# Patient Record
Sex: MALE | Race: WHITE | Employment: OTHER
[De-identification: names, ages, dates, MRNs, and addresses within clinical notes are randomized per-mention and may not be internally consistent; named-entity substitution may affect disease eponyms.]

---

## 2021-04-23 ENCOUNTER — TRANSCRIBE ORDERS (OUTPATIENT)
Dept: SCHEDULING | Age: 66
End: 2021-04-23

## 2021-04-23 DIAGNOSIS — Z11.59 ENCOUNTER FOR SCREENING FOR OTHER VIRAL DISEASES: Primary | ICD-10-CM

## 2021-05-11 ENCOUNTER — APPOINTMENT (OUTPATIENT)
Dept: LAB | Facility: HOSPITAL | Age: 66
End: 2021-05-11
Attending: PODIATRIST
Payer: MEDICARE

## 2021-05-11 DIAGNOSIS — Z11.59 ENCOUNTER FOR SCREENING FOR OTHER VIRAL DISEASES: ICD-10-CM

## 2021-05-11 PROCEDURE — U0002 COVID-19 LAB TEST NON-CDC: HCPCS

## 2021-05-12 LAB — SARS-COV-2 RNA RESP QL NAA+PROBE: NEGATIVE

## 2021-05-13 ENCOUNTER — ANESTHESIA EVENT (OUTPATIENT)
Dept: OPERATING ROOM | Facility: HOSPITAL | Age: 66
Setting detail: HOSPITAL OUTPATIENT SURGERY
End: 2021-05-13
Payer: MEDICARE

## 2021-05-14 ENCOUNTER — ANESTHESIA (OUTPATIENT)
Dept: OPERATING ROOM | Facility: HOSPITAL | Age: 66
Setting detail: HOSPITAL OUTPATIENT SURGERY
End: 2021-05-14
Payer: MEDICARE

## 2021-05-14 ENCOUNTER — HOSPITAL ENCOUNTER (OUTPATIENT)
Facility: HOSPITAL | Age: 66
Setting detail: HOSPITAL OUTPATIENT SURGERY
Discharge: HOME | End: 2021-05-14
Attending: PODIATRIST | Admitting: PODIATRIST
Payer: MEDICARE

## 2021-05-14 VITALS
WEIGHT: 205 LBS | RESPIRATION RATE: 16 BRPM | DIASTOLIC BLOOD PRESSURE: 95 MMHG | TEMPERATURE: 97.8 F | HEART RATE: 69 BPM | HEIGHT: 70 IN | BODY MASS INDEX: 29.35 KG/M2 | SYSTOLIC BLOOD PRESSURE: 148 MMHG | OXYGEN SATURATION: 98 %

## 2021-05-14 LAB
GLUCOSE BLD-MCNC: 98 MG/DL (ref 70–99)
POCT TEST: NORMAL

## 2021-05-14 PROCEDURE — 36000003 HC OR LEVEL 3 INITIAL 30MIN: Performed by: PODIATRIST

## 2021-05-14 PROCEDURE — 71000002 HC PACU PHASE 2 INITIAL 30MIN: Performed by: PODIATRIST

## 2021-05-14 PROCEDURE — 71000001 HC PACU PHASE 1 INITIAL 30MIN: Performed by: PODIATRIST

## 2021-05-14 PROCEDURE — 27200000 HC STERILE SUPPLY: Performed by: PODIATRIST

## 2021-05-14 PROCEDURE — 63600000 HC DRUGS/DETAIL CODE: Performed by: NURSE ANESTHETIST, CERTIFIED REGISTERED

## 2021-05-14 PROCEDURE — 37000002 HC ANESTHESIA MAC: Performed by: PODIATRIST

## 2021-05-14 PROCEDURE — 71000011 HC PACU PHASE 1 EA ADDL MIN: Performed by: PODIATRIST

## 2021-05-14 PROCEDURE — 71000012 HC PACU PHASE 2 EA ADDL MIN: Performed by: PODIATRIST

## 2021-05-14 PROCEDURE — 25800000 HC PHARMACY IV SOLUTIONS: Performed by: NURSE ANESTHETIST, CERTIFIED REGISTERED

## 2021-05-14 PROCEDURE — 63600000 HC DRUGS/DETAIL CODE: Performed by: STUDENT IN AN ORGANIZED HEALTH CARE EDUCATION/TRAINING PROGRAM

## 2021-05-14 PROCEDURE — 0LNT0ZZ RELEASE LEFT ANKLE TENDON, OPEN APPROACH: ICD-10-PCS | Performed by: PODIATRIST

## 2021-05-14 DEVICE — ALLOGRAFT 2.0ML ALLOGEN FLUID CONNECTIVE TISSUE MATRIX: Type: IMPLANTABLE DEVICE | Site: FOOT | Status: FUNCTIONAL

## 2021-05-14 RX ORDER — CEFAZOLIN SODIUM 2 G/50ML
2 SOLUTION INTRAVENOUS
Status: COMPLETED | OUTPATIENT
Start: 2021-05-14 | End: 2021-05-14

## 2021-05-14 RX ORDER — CEFAZOLIN SODIUM 2 G/50ML
SOLUTION INTRAVENOUS
Status: COMPLETED
Start: 2021-05-14 | End: 2021-05-14

## 2021-05-14 RX ORDER — FENTANYL CITRATE 50 UG/ML
50 INJECTION, SOLUTION INTRAMUSCULAR; INTRAVENOUS
Status: DISCONTINUED | OUTPATIENT
Start: 2021-05-14 | End: 2021-05-14 | Stop reason: HOSPADM

## 2021-05-14 RX ORDER — HYDROMORPHONE HYDROCHLORIDE 1 MG/ML
0.5 INJECTION, SOLUTION INTRAMUSCULAR; INTRAVENOUS; SUBCUTANEOUS
Status: DISCONTINUED | OUTPATIENT
Start: 2021-05-14 | End: 2021-05-14 | Stop reason: HOSPADM

## 2021-05-14 RX ORDER — SODIUM CHLORIDE 9 MG/ML
INJECTION, SOLUTION INTRAVENOUS CONTINUOUS PRN
Status: DISCONTINUED | OUTPATIENT
Start: 2021-05-14 | End: 2021-05-14 | Stop reason: SURG

## 2021-05-14 RX ORDER — FENTANYL CITRATE 50 UG/ML
INJECTION, SOLUTION INTRAMUSCULAR; INTRAVENOUS AS NEEDED
Status: DISCONTINUED | OUTPATIENT
Start: 2021-05-14 | End: 2021-05-14 | Stop reason: SURG

## 2021-05-14 RX ORDER — PROPOFOL 10 MG/ML
INJECTION, EMULSION INTRAVENOUS CONTINUOUS PRN
Status: DISCONTINUED | OUTPATIENT
Start: 2021-05-14 | End: 2021-05-14 | Stop reason: SURG

## 2021-05-14 RX ADMIN — CEFAZOLIN 2 G: 330 INJECTION, POWDER, FOR SOLUTION INTRAMUSCULAR; INTRAVENOUS at 13:57

## 2021-05-14 RX ADMIN — SODIUM CHLORIDE: 900 INJECTION, SOLUTION INTRAVENOUS at 13:56

## 2021-05-14 RX ADMIN — PROPOFOL 333 MCG/KG/MIN: 10 INJECTION, EMULSION INTRAVENOUS at 13:58

## 2021-05-14 RX ADMIN — FENTANYL CITRATE 25 MCG: 50 INJECTION, SOLUTION INTRAMUSCULAR; INTRAVENOUS at 13:58

## 2021-05-14 ASSESSMENT — PAIN - FUNCTIONAL ASSESSMENT: PAIN_FUNCTIONAL_ASSESSMENT: NO/DENIES PAIN

## 2021-05-14 ASSESSMENT — PAIN SCALES - GENERAL: PAIN_LEVEL: 0

## 2021-05-14 NOTE — ANESTHESIA POSTPROCEDURE EVALUATION
Patient: Scott Canseco    Procedure Summary     Date: 05/14/21 Room / Location: [facility] OR  / [facility] OR [facility]    Anesthesia Start: 1356 Anesthesia Stop: 1425    Procedure: Left Achilles Tenolysis Under Ultrasound Guidance (Left ) Diagnosis:       Tendonitis, Achilles, left      (Tendonitis, Achilles, left [M76.62])    Surgeons: Nahomy Argueta DPM Responsible Provider: Isreal Guzman MD    Anesthesia Type: MAC ASA Status: 2          Anesthesia Type: MAC  PACU Vitals    No data found in the last 10 encounters.           Anesthesia Post Evaluation    Pain score: 0  Pain management: adequate  Patient location during evaluation: PACU  Patient participation: complete - patient participated  Level of consciousness: awake and alert  Cardiovascular status: acceptable  Airway Patency: adequate  Respiratory status: acceptable, nasal cannula and spontaneous ventilation  Hydration status: acceptable  Anesthetic complications: no

## 2021-05-14 NOTE — ANESTHESIOLOGIST PRE-PROCEDURE ATTESTATION
Pre-Procedure Patient Identification:  I am the Primary Anesthesiologist and have identified the patient on 05/14/21 at 1:40 PM.   I have confirmed the following procedure(s) Left Achilles Tenolysis Under Ultrasound Guidance (L) will be performed by the following surgeon/proceduralist Nahomy Argueta DPM.

## 2021-05-14 NOTE — OP NOTE
Operative Note    Hospital: [facility]  Medical Record Number:  [id number]  Patient Name:  Scott Canseco  YOB: 1955   Date of Operation:  5/14/2021  Primary Surgeon: Nahomy Argueta D.P.M.  First Assistant: Boston Downs DPM    Preoperative Diagnosis: Left Achilles tendinitis    Postoperative Diagnosis: Same    Procedure: Left Achilles tenolysis under ultrasound guidance with amniotic graft injection    Anesthesia: Monitor Anesthesia Care  + local (10 cc of 1% lidocaine plain)    Hemostasis: No tourniquet used    Operative Findings: as expected    Estimated Blood Loss: 0 mL    Specimens: None    Implants:   Implant Name Type Inv. Item Serial No.  Lot No. LRB No. Used Action   ALLOGRAFT 2.0ML ALLOGEN FLUID CONNECTIVE TISSUE MATRIX - [id number] - [id number] Implant Tissue ALLOGRAFT 2.0ML ALLOGEN FLUID CONNECTIVE TISSUE [employer]X [id number] Other N/A Left 1 Implanted   ALLOGRAFT 2.0ML ALLOGEN FLUID CONNECTIVE TISSUE [employer]X - [id number] - [id number] Implant Tissue ALLOGRAFT 2.0ML ALLOGEN FLUID CONNECTIVE TISSUE [employer]X [id number] Other N/A Left 1 Implanted       Injectables: 4cc of allogen mixed with 4cc 1% lidocaine plain         Complications:  None; patient tolerated the procedure well.           Disposition: PACU - hemodynamically stable.           Condition: stable    Summary :   Scott Canseco presents to [facility] for surgical intervention of his left ankle, specifically pain and tendonitis of the left Achilles tendon. He has attempted many outpatient conservative therapies without resolution of his symptoms. Outpatient imaging reveals continued thickening and inflammation about the Achilles tendon watershed area with some extension distally towards the insertion. He has opted for surgical intervention at this time for ultrasound guidance injection of amnion with tenolysis. All risks, complications, benefits, and alternative procedures were  discussed in full detail with the patient. All questions were answered. No guarantees were given. In pre-op the patients’ vital signs are stable and neurovascular status is intact.     The patient was transferred to the operating room and placed on the operating room table in the supine position.  The correct surgical site was identified which is the left ankle.  Once anesthesia was achieved, the above mentioned local block was administered. The foot and ankle were prepped and draped in the usual aseptic technique and a timeout was performed before the beginning of the procedure.     Procedure #1: Ultrasound guided debridement and Achilles repair with stem cell injection left achilles tendon     Attention was then directed towards the posterior left Achilles tendon.  There is noted to be a palpable thickening of the Achilles tendon in the mid substance of the tendon about 3-6 cm proximal to its insertion. A steriley prepped ultrasound was utilized for visualization of the Achilles tendon. The appearance of the Achilles tendon under ultrasound revealed thickening and intrasubstance tendon fiber tears consistent with chronic Achilles tendon injury. There were no complete ruptures or tears noted. Next, a 25-gauge needle was utilized for debridement of the tendon. The needle was inserted from lateral to medial across the entire width of the tendon at the level of tendon damage. Special care was taken to not cause excessive damage to the surrounding paratenon. The ultrasound was utilized to guide the needle through the tendon in multiple directions and planes. Five separate sites of debridement were utilized to encompass the entire level of the degenerative changes in the tendon. After debridement a total of 4cc of Allogen mixed with 4cc of 1% Lidocaine plain was injected into the Achilles tendon at each level of degeneration and tearing with a 25 gauge needle under ultrasound visualization.  A dressing consisting of an  Mepliex, cast padding  and an ACE bandage was applied to the left Achilles.      The patient tolerated the procedure well with all vital signs stable and neurovascular status intact. Once aroused from anesthesia the patient was transferred from the operating room to PACU and discharged home per protocol.    Patient is instructed to be weight bearing as tolerated in a cam walker and to follow-up with Dr. Nahomy Argueta in 1 week    Nahomy Argueta DPM  Phone Number: [phone]

## 2021-05-14 NOTE — DISCHARGE INSTRUCTIONS
[facility]  [place], [place]  [phone]  Dr. Nahomy Argueta    Post Operative Instructions    Keep foot elevated as much as possible above the level of the heart.    Keep bandage clean and dry.  Do not remove unless instructed to do so by your Physician.  If bandage becomes soiled or wet you may change it and apply band-aid    Apply ice pack to posterior leg for 20 minutes 3-5 times a day unless otherwise advised.      Bear weight on the area only as advised:  Weight bearing as tolerated in CAM boot    Should you have excessive bleeding (through the bandage) or excessive discomfort, please call the office immediately.    Please call the office number above: to set up your 1st follow up appointment in 1-week

## 2021-05-14 NOTE — OR SURGEON
I am the primary attending surgeon/physician for procedure on Scott Canseco and have identified the patient by name, MR#, and date of birth. Laterality of the procedure was identified and full review of the procedure to be performed was discussed.    Nahomy Argueta DPM   [phone]

## 2021-05-14 NOTE — ANESTHESIA PREPROCEDURE EVALUATION
Relevant Problems   No relevant active problems       Anesthesia ROS/MED HX      Cardiovascular   hypertension   Covid19 Test Reviewed and ECG reviewed  Endo/Other   Diabetes  Body Habitus: Overweight       No past surgical history on file.    Physical Exam    Airway   Mallampati: I   TM distance: >3 FB   Neck ROM: full  Cardiovascular    Rhythm: regular   Rate: normalPulmonary    clear to auscultation        Anesthesia Plan    Plan: MAC    Technique: MAC     Airway: natural airway / supplemental oxygen       patient did not smoke on day of surgery  ASA 2  Blood Products:   Use of Blood Products Discussed: No   Anesthetic plan and risks discussed with: patient  Induction:    intravenous   Postop Plan:   Patient Disposition: phase II then home   Pain Management: IV analgesics

## 2022-03-04 ENCOUNTER — HOSPITAL ENCOUNTER (OUTPATIENT)
Dept: RADIOLOGY | Facility: MEDICAL CENTER | Age: 67
End: 2022-03-04

## 2022-03-04 ENCOUNTER — APPOINTMENT (OUTPATIENT)
Dept: RADIOLOGY | Facility: MEDICAL CENTER | Age: 67
DRG: 089 | End: 2022-03-04
Payer: MEDICARE

## 2022-03-04 ENCOUNTER — HOSPITAL ENCOUNTER (INPATIENT)
Facility: MEDICAL CENTER | Age: 67
LOS: 1 days | DRG: 089 | End: 2022-03-05
Attending: EMERGENCY MEDICINE | Admitting: SURGERY
Payer: MEDICARE

## 2022-03-04 DIAGNOSIS — S02.31XA CLOSED FRACTURE OF RIGHT ORBITAL FLOOR, INITIAL ENCOUNTER (HCC): ICD-10-CM

## 2022-03-04 PROBLEM — E78.5 HYPERLIPIDEMIA: Status: ACTIVE | Noted: 2022-03-04

## 2022-03-04 PROBLEM — T14.90XA TRAUMA: Status: ACTIVE | Noted: 2022-03-04

## 2022-03-04 PROBLEM — M25.511 ACUTE PAIN OF RIGHT SHOULDER: Status: ACTIVE | Noted: 2022-03-04

## 2022-03-04 PROBLEM — G47.33 OBSTRUCTIVE SLEEP APNEA SYNDROME: Status: ACTIVE | Noted: 2022-03-04

## 2022-03-04 PROBLEM — S06.0X0A CONCUSSION WITHOUT LOSS OF CONSCIOUSNESS: Status: ACTIVE | Noted: 2022-03-04

## 2022-03-04 PROBLEM — Z78.9 NO CONTRAINDICATION TO DEEP VEIN THROMBOSIS (DVT) PROPHYLAXIS: Status: ACTIVE | Noted: 2022-03-04

## 2022-03-04 PROBLEM — Z11.52 ENCOUNTER FOR SCREENING FOR COVID-19: Status: ACTIVE | Noted: 2022-03-04

## 2022-03-04 LAB
ABO GROUP BLD: NORMAL
ALBUMIN SERPL BCP-MCNC: 4.5 G/DL (ref 3.2–4.9)
ALBUMIN/GLOB SERPL: 1.7 G/DL
ALP SERPL-CCNC: 47 U/L (ref 30–99)
ALT SERPL-CCNC: 24 U/L (ref 2–50)
ANION GAP SERPL CALC-SCNC: 14 MMOL/L (ref 7–16)
APTT PPP: 26.2 SEC (ref 24.7–36)
AST SERPL-CCNC: 28 U/L (ref 12–45)
BILIRUB SERPL-MCNC: 0.8 MG/DL (ref 0.1–1.5)
BLD GP AB SCN SERPL QL: NORMAL
BUN SERPL-MCNC: 21 MG/DL (ref 8–22)
CALCIUM SERPL-MCNC: 9.7 MG/DL (ref 8.5–10.5)
CHLORIDE SERPL-SCNC: 106 MMOL/L (ref 96–112)
CO2 SERPL-SCNC: 20 MMOL/L (ref 20–33)
CREAT SERPL-MCNC: 0.98 MG/DL (ref 0.5–1.4)
ERYTHROCYTE [DISTWIDTH] IN BLOOD BY AUTOMATED COUNT: 44 FL (ref 35.9–50)
ETHANOL BLD-MCNC: <10.1 MG/DL (ref 0–10)
FLUAV RNA SPEC QL NAA+PROBE: NEGATIVE
FLUBV RNA SPEC QL NAA+PROBE: NEGATIVE
GLOBULIN SER CALC-MCNC: 2.7 G/DL (ref 1.9–3.5)
GLUCOSE SERPL-MCNC: 116 MG/DL (ref 65–99)
HCT VFR BLD AUTO: 41.9 % (ref 42–52)
HGB BLD-MCNC: 14.4 G/DL (ref 14–18)
INR PPP: 1.03 (ref 0.87–1.13)
MCH RBC QN AUTO: 31.2 PG (ref 27–33)
MCHC RBC AUTO-ENTMCNC: 34.4 G/DL (ref 33.7–35.3)
MCV RBC AUTO: 90.7 FL (ref 81.4–97.8)
PLATELET # BLD AUTO: 166 K/UL (ref 164–446)
PMV BLD AUTO: 10.6 FL (ref 9–12.9)
POTASSIUM SERPL-SCNC: 3.9 MMOL/L (ref 3.6–5.5)
PROT SERPL-MCNC: 7.2 G/DL (ref 6–8.2)
PROTHROMBIN TIME: 13.2 SEC (ref 12–14.6)
RBC # BLD AUTO: 4.62 M/UL (ref 4.7–6.1)
RH BLD: NORMAL
RSV RNA SPEC QL NAA+PROBE: NEGATIVE
SARS-COV-2 RNA RESP QL NAA+PROBE: NOTDETECTED
SODIUM SERPL-SCNC: 140 MMOL/L (ref 135–145)
SPECIMEN SOURCE: NORMAL
WBC # BLD AUTO: 12 K/UL (ref 4.8–10.8)

## 2022-03-04 PROCEDURE — 0241U HCHG SARS-COV-2 COVID-19 NFCT DS RESP RNA 4 TRGT MIC: CPT

## 2022-03-04 PROCEDURE — 700102 HCHG RX REV CODE 250 W/ 637 OVERRIDE(OP): Performed by: SPECIALIST

## 2022-03-04 PROCEDURE — 86850 RBC ANTIBODY SCREEN: CPT

## 2022-03-04 PROCEDURE — 99285 EMERGENCY DEPT VISIT HI MDM: CPT

## 2022-03-04 PROCEDURE — 85730 THROMBOPLASTIN TIME PARTIAL: CPT | Mod: 91

## 2022-03-04 PROCEDURE — 36415 COLL VENOUS BLD VENIPUNCTURE: CPT

## 2022-03-04 PROCEDURE — 96375 TX/PRO/DX INJ NEW DRUG ADDON: CPT

## 2022-03-04 PROCEDURE — 80053 COMPREHEN METABOLIC PANEL: CPT | Mod: 91

## 2022-03-04 PROCEDURE — 700105 HCHG RX REV CODE 258: Performed by: EMERGENCY MEDICINE

## 2022-03-04 PROCEDURE — 96374 THER/PROPH/DIAG INJ IV PUSH: CPT

## 2022-03-04 PROCEDURE — 99222 1ST HOSP IP/OBS MODERATE 55: CPT | Performed by: SURGERY

## 2022-03-04 PROCEDURE — 700111 HCHG RX REV CODE 636 W/ 250 OVERRIDE (IP): Performed by: EMERGENCY MEDICINE

## 2022-03-04 PROCEDURE — 3E0234Z INTRODUCTION OF SERUM, TOXOID AND VACCINE INTO MUSCLE, PERCUTANEOUS APPROACH: ICD-10-PCS | Performed by: SURGERY

## 2022-03-04 PROCEDURE — 82077 ASSAY SPEC XCP UR&BREATH IA: CPT

## 2022-03-04 PROCEDURE — 90471 IMMUNIZATION ADMIN: CPT

## 2022-03-04 PROCEDURE — C9803 HOPD COVID-19 SPEC COLLECT: HCPCS | Performed by: SPECIALIST

## 2022-03-04 PROCEDURE — 770001 HCHG ROOM/CARE - MED/SURG/GYN PRIV*

## 2022-03-04 PROCEDURE — 700105 HCHG RX REV CODE 258: Performed by: SPECIALIST

## 2022-03-04 PROCEDURE — 85610 PROTHROMBIN TIME: CPT | Mod: 91

## 2022-03-04 PROCEDURE — 700101 HCHG RX REV CODE 250: Performed by: EMERGENCY MEDICINE

## 2022-03-04 PROCEDURE — 86900 BLOOD TYPING SEROLOGIC ABO: CPT

## 2022-03-04 PROCEDURE — 305948 HCHG GREEN TRAUMA ACT PRE-NOTIFY NO CC

## 2022-03-04 PROCEDURE — 85027 COMPLETE CBC AUTOMATED: CPT

## 2022-03-04 PROCEDURE — 86901 BLOOD TYPING SEROLOGIC RH(D): CPT

## 2022-03-04 PROCEDURE — A9270 NON-COVERED ITEM OR SERVICE: HCPCS | Performed by: SPECIALIST

## 2022-03-04 PROCEDURE — 90715 TDAP VACCINE 7 YRS/> IM: CPT | Performed by: EMERGENCY MEDICINE

## 2022-03-04 RX ORDER — AMOXICILLIN 250 MG
1 CAPSULE ORAL
Status: DISCONTINUED | OUTPATIENT
Start: 2022-03-04 | End: 2022-03-05 | Stop reason: HOSPADM

## 2022-03-04 RX ORDER — AMOXICILLIN 250 MG
1 CAPSULE ORAL NIGHTLY
Status: DISCONTINUED | OUTPATIENT
Start: 2022-03-04 | End: 2022-03-05 | Stop reason: HOSPADM

## 2022-03-04 RX ORDER — ACETAMINOPHEN 650 MG/1
650 SUPPOSITORY RECTAL EVERY 4 HOURS PRN
Status: DISCONTINUED | OUTPATIENT
Start: 2022-03-04 | End: 2022-03-05 | Stop reason: HOSPADM

## 2022-03-04 RX ORDER — FLUTICASONE PROPIONATE 50 MCG
1 SPRAY, SUSPENSION (ML) NASAL
COMMUNITY

## 2022-03-04 RX ORDER — ACETAMINOPHEN 160 MG
2000 TABLET,DISINTEGRATING ORAL EVERY EVENING
COMMUNITY

## 2022-03-04 RX ORDER — OXYCODONE HYDROCHLORIDE 5 MG/1
2.5 TABLET ORAL
Status: DISCONTINUED | OUTPATIENT
Start: 2022-03-04 | End: 2022-03-05 | Stop reason: HOSPADM

## 2022-03-04 RX ORDER — ACETAMINOPHEN 325 MG/1
650 TABLET ORAL EVERY 6 HOURS
Status: DISCONTINUED | OUTPATIENT
Start: 2022-03-05 | End: 2022-03-05 | Stop reason: HOSPADM

## 2022-03-04 RX ORDER — ONDANSETRON 2 MG/ML
4 INJECTION INTRAMUSCULAR; INTRAVENOUS EVERY 4 HOURS PRN
Status: DISCONTINUED | OUTPATIENT
Start: 2022-03-04 | End: 2022-03-05 | Stop reason: HOSPADM

## 2022-03-04 RX ORDER — LABETALOL HYDROCHLORIDE 5 MG/ML
10 INJECTION, SOLUTION INTRAVENOUS EVERY 4 HOURS PRN
Status: DISCONTINUED | OUTPATIENT
Start: 2022-03-04 | End: 2022-03-05 | Stop reason: HOSPADM

## 2022-03-04 RX ORDER — ACETAMINOPHEN 325 MG/1
650 TABLET ORAL EVERY 6 HOURS PRN
Status: DISCONTINUED | OUTPATIENT
Start: 2022-03-10 | End: 2022-03-05 | Stop reason: HOSPADM

## 2022-03-04 RX ORDER — OXYCODONE HYDROCHLORIDE 5 MG/1
5 TABLET ORAL
Status: DISCONTINUED | OUTPATIENT
Start: 2022-03-04 | End: 2022-03-05 | Stop reason: HOSPADM

## 2022-03-04 RX ORDER — ONDANSETRON 4 MG/1
4 TABLET, ORALLY DISINTEGRATING ORAL EVERY 4 HOURS PRN
Status: DISCONTINUED | OUTPATIENT
Start: 2022-03-04 | End: 2022-03-05 | Stop reason: HOSPADM

## 2022-03-04 RX ORDER — LOSARTAN POTASSIUM 50 MG/1
50 TABLET ORAL EVERY EVENING
COMMUNITY

## 2022-03-04 RX ORDER — ACETAMINOPHEN 325 MG/1
650 TABLET ORAL EVERY 4 HOURS PRN
Status: DISCONTINUED | OUTPATIENT
Start: 2022-03-04 | End: 2022-03-05 | Stop reason: HOSPADM

## 2022-03-04 RX ORDER — ROSUVASTATIN CALCIUM 5 MG/1
20 TABLET, COATED ORAL EVERY EVENING
Status: DISCONTINUED | OUTPATIENT
Start: 2022-03-04 | End: 2022-03-05 | Stop reason: HOSPADM

## 2022-03-04 RX ORDER — POLYETHYLENE GLYCOL 3350 17 G/17G
1 POWDER, FOR SOLUTION ORAL 2 TIMES DAILY
Status: DISCONTINUED | OUTPATIENT
Start: 2022-03-04 | End: 2022-03-05 | Stop reason: HOSPADM

## 2022-03-04 RX ORDER — ROSUVASTATIN CALCIUM 20 MG/1
20 TABLET, COATED ORAL EVERY EVENING
COMMUNITY

## 2022-03-04 RX ORDER — HYDROMORPHONE HYDROCHLORIDE 1 MG/ML
0.25 INJECTION, SOLUTION INTRAMUSCULAR; INTRAVENOUS; SUBCUTANEOUS
Status: DISCONTINUED | OUTPATIENT
Start: 2022-03-04 | End: 2022-03-05 | Stop reason: HOSPADM

## 2022-03-04 RX ORDER — BISACODYL 10 MG
10 SUPPOSITORY, RECTAL RECTAL
Status: DISCONTINUED | OUTPATIENT
Start: 2022-03-04 | End: 2022-03-05 | Stop reason: HOSPADM

## 2022-03-04 RX ORDER — LOSARTAN POTASSIUM 50 MG/1
50 TABLET ORAL EVERY EVENING
Status: DISCONTINUED | OUTPATIENT
Start: 2022-03-04 | End: 2022-03-05 | Stop reason: HOSPADM

## 2022-03-04 RX ORDER — ENEMA 19; 7 G/133ML; G/133ML
1 ENEMA RECTAL
Status: DISCONTINUED | OUTPATIENT
Start: 2022-03-04 | End: 2022-03-05 | Stop reason: HOSPADM

## 2022-03-04 RX ORDER — FLUTICASONE PROPIONATE 50 MCG
1 SPRAY, SUSPENSION (ML) NASAL
Status: DISCONTINUED | OUTPATIENT
Start: 2022-03-04 | End: 2022-03-05 | Stop reason: HOSPADM

## 2022-03-04 RX ORDER — ONDANSETRON 2 MG/ML
4 INJECTION INTRAMUSCULAR; INTRAVENOUS ONCE
Status: COMPLETED | OUTPATIENT
Start: 2022-03-04 | End: 2022-03-04

## 2022-03-04 RX ORDER — SODIUM CHLORIDE, SODIUM LACTATE, POTASSIUM CHLORIDE, CALCIUM CHLORIDE 600; 310; 30; 20 MG/100ML; MG/100ML; MG/100ML; MG/100ML
INJECTION, SOLUTION INTRAVENOUS CONTINUOUS
Status: DISCONTINUED | OUTPATIENT
Start: 2022-03-04 | End: 2022-03-05 | Stop reason: HOSPADM

## 2022-03-04 RX ORDER — LORATADINE 10 MG/1
10 TABLET ORAL EVERY EVENING
Status: DISCONTINUED | OUTPATIENT
Start: 2022-03-04 | End: 2022-03-05 | Stop reason: HOSPADM

## 2022-03-04 RX ORDER — DOCUSATE SODIUM 100 MG/1
100 CAPSULE, LIQUID FILLED ORAL 2 TIMES DAILY
Status: DISCONTINUED | OUTPATIENT
Start: 2022-03-04 | End: 2022-03-05 | Stop reason: HOSPADM

## 2022-03-04 RX ORDER — LORATADINE 10 MG/1
10 TABLET ORAL EVERY EVENING
COMMUNITY

## 2022-03-04 RX ORDER — PROPARACAINE HYDROCHLORIDE 5 MG/ML
1 SOLUTION/ DROPS OPHTHALMIC ONCE
Status: COMPLETED | OUTPATIENT
Start: 2022-03-04 | End: 2022-03-04

## 2022-03-04 RX ORDER — SODIUM CHLORIDE, SODIUM LACTATE, POTASSIUM CHLORIDE, CALCIUM CHLORIDE 600; 310; 30; 20 MG/100ML; MG/100ML; MG/100ML; MG/100ML
1000 INJECTION, SOLUTION INTRAVENOUS ONCE
Status: COMPLETED | OUTPATIENT
Start: 2022-03-04 | End: 2022-03-04

## 2022-03-04 RX ADMIN — METFORMIN HYDROCHLORIDE 500 MG: 500 TABLET ORAL at 21:30

## 2022-03-04 RX ADMIN — ACETAMINOPHEN 650 MG: 325 TABLET, FILM COATED ORAL at 21:41

## 2022-03-04 RX ADMIN — PROPARACAINE HYDROCHLORIDE 1 DROP: 5 SOLUTION/ DROPS OPHTHALMIC at 17:45

## 2022-03-04 RX ADMIN — ROSUVASTATIN CALCIUM 20 MG: 20 TABLET, FILM COATED ORAL at 21:41

## 2022-03-04 RX ADMIN — CLOSTRIDIUM TETANI TOXOID ANTIGEN (FORMALDEHYDE INACTIVATED), CORYNEBACTERIUM DIPHTHERIAE TOXOID ANTIGEN (FORMALDEHYDE INACTIVATED), BORDETELLA PERTUSSIS TOXOID ANTIGEN (GLUTARALDEHYDE INACTIVATED), BORDETELLA PERTUSSIS FILAMENTOUS HEMAGGLUTININ ANTIGEN (FORMALDEHYDE INACTIVATED), BORDETELLA PERTUSSIS PERTACTIN ANTIGEN, AND BORDETELLA PERTUSSIS FIMBRIAE 2/3 ANTIGEN 0.5 ML: 5; 2; 2.5; 5; 3; 5 INJECTION, SUSPENSION INTRAMUSCULAR at 18:37

## 2022-03-04 RX ADMIN — LOSARTAN POTASSIUM 50 MG: 50 TABLET, FILM COATED ORAL at 21:40

## 2022-03-04 RX ADMIN — SODIUM CHLORIDE, POTASSIUM CHLORIDE, SODIUM LACTATE AND CALCIUM CHLORIDE 1000 ML: 600; 310; 30; 20 INJECTION, SOLUTION INTRAVENOUS at 18:37

## 2022-03-04 RX ADMIN — SODIUM CHLORIDE, POTASSIUM CHLORIDE, SODIUM LACTATE AND CALCIUM CHLORIDE: 600; 310; 30; 20 INJECTION, SOLUTION INTRAVENOUS at 21:42

## 2022-03-04 RX ADMIN — ONDANSETRON 4 MG: 2 INJECTION INTRAMUSCULAR; INTRAVENOUS at 18:15

## 2022-03-04 RX ADMIN — FENTANYL CITRATE 50 MCG: 50 INJECTION INTRAMUSCULAR; INTRAVENOUS at 18:39

## 2022-03-04 RX ADMIN — FLUORESCEIN SODIUM 1 MG: 1 STRIP OPHTHALMIC at 17:45

## 2022-03-04 ASSESSMENT — FIBROSIS 4 INDEX
FIB4 SCORE: 2.7
FIB4 SCORE: 2.7

## 2022-03-05 ENCOUNTER — PHARMACY VISIT (OUTPATIENT)
Dept: PHARMACY | Facility: MEDICAL CENTER | Age: 67
End: 2022-03-05
Payer: COMMERCIAL

## 2022-03-05 VITALS
HEART RATE: 73 BPM | OXYGEN SATURATION: 98 % | RESPIRATION RATE: 17 BRPM | TEMPERATURE: 97.2 F | BODY MASS INDEX: 36.36 KG/M2 | WEIGHT: 212.96 LBS | SYSTOLIC BLOOD PRESSURE: 160 MMHG | HEIGHT: 64 IN | DIASTOLIC BLOOD PRESSURE: 94 MMHG

## 2022-03-05 LAB
ABO + RH BLD: NORMAL
ANION GAP SERPL CALC-SCNC: 12 MMOL/L (ref 7–16)
BASOPHILS # BLD AUTO: 0.3 % (ref 0–1.8)
BASOPHILS # BLD: 0.04 K/UL (ref 0–0.12)
BUN SERPL-MCNC: 20 MG/DL (ref 8–22)
CALCIUM SERPL-MCNC: 8.7 MG/DL (ref 8.5–10.5)
CHLORIDE SERPL-SCNC: 106 MMOL/L (ref 96–112)
CO2 SERPL-SCNC: 21 MMOL/L (ref 20–33)
CREAT SERPL-MCNC: 0.9 MG/DL (ref 0.5–1.4)
EOSINOPHIL # BLD AUTO: 0.08 K/UL (ref 0–0.51)
EOSINOPHIL NFR BLD: 0.6 % (ref 0–6.9)
ERYTHROCYTE [DISTWIDTH] IN BLOOD BY AUTOMATED COUNT: 43.5 FL (ref 35.9–50)
GLUCOSE BLD STRIP.AUTO-MCNC: 131 MG/DL (ref 65–99)
GLUCOSE BLD STRIP.AUTO-MCNC: 172 MG/DL (ref 65–99)
GLUCOSE SERPL-MCNC: 104 MG/DL (ref 65–99)
HCT VFR BLD AUTO: 39.4 % (ref 42–52)
HGB BLD-MCNC: 13.6 G/DL (ref 14–18)
IMM GRANULOCYTES # BLD AUTO: 0.06 K/UL (ref 0–0.11)
IMM GRANULOCYTES NFR BLD AUTO: 0.5 % (ref 0–0.9)
LYMPHOCYTES # BLD AUTO: 2.13 K/UL (ref 1–4.8)
LYMPHOCYTES NFR BLD: 16.3 % (ref 22–41)
MCH RBC QN AUTO: 31 PG (ref 27–33)
MCHC RBC AUTO-ENTMCNC: 34.5 G/DL (ref 33.7–35.3)
MCV RBC AUTO: 89.7 FL (ref 81.4–97.8)
MONOCYTES # BLD AUTO: 1.16 K/UL (ref 0–0.85)
MONOCYTES NFR BLD AUTO: 8.9 % (ref 0–13.4)
NEUTROPHILS # BLD AUTO: 9.63 K/UL (ref 1.82–7.42)
NEUTROPHILS NFR BLD: 73.4 % (ref 44–72)
NRBC # BLD AUTO: 0 K/UL
NRBC BLD-RTO: 0 /100 WBC
PLATELET # BLD AUTO: 182 K/UL (ref 164–446)
PMV BLD AUTO: 10.9 FL (ref 9–12.9)
POTASSIUM SERPL-SCNC: 4.1 MMOL/L (ref 3.6–5.5)
RBC # BLD AUTO: 4.39 M/UL (ref 4.7–6.1)
SODIUM SERPL-SCNC: 139 MMOL/L (ref 135–145)
WBC # BLD AUTO: 13.1 K/UL (ref 4.8–10.8)

## 2022-03-05 PROCEDURE — RXMED WILLOW AMBULATORY MEDICATION CHARGE: Performed by: NURSE PRACTITIONER

## 2022-03-05 PROCEDURE — 99238 HOSP IP/OBS DSCHRG MGMT 30/<: CPT | Performed by: NURSE PRACTITIONER

## 2022-03-05 PROCEDURE — 82962 GLUCOSE BLOOD TEST: CPT | Mod: 91

## 2022-03-05 PROCEDURE — 80048 BASIC METABOLIC PNL TOTAL CA: CPT

## 2022-03-05 PROCEDURE — A9270 NON-COVERED ITEM OR SERVICE: HCPCS | Performed by: SPECIALIST

## 2022-03-05 PROCEDURE — 85025 COMPLETE CBC W/AUTO DIFF WBC: CPT

## 2022-03-05 PROCEDURE — 97535 SELF CARE MNGMENT TRAINING: CPT

## 2022-03-05 PROCEDURE — 700102 HCHG RX REV CODE 250 W/ 637 OVERRIDE(OP): Performed by: SPECIALIST

## 2022-03-05 RX ORDER — METHOCARBAMOL 750 MG/1
750 TABLET, FILM COATED ORAL 3 TIMES DAILY PRN
Qty: 21 TABLET | Refills: 0 | Status: SHIPPED | OUTPATIENT
Start: 2022-03-05 | End: 2022-03-19

## 2022-03-05 RX ORDER — ACETAMINOPHEN 325 MG/1
650 TABLET ORAL EVERY 6 HOURS
Qty: 30 TABLET | Refills: 0 | COMMUNITY
Start: 2022-03-05

## 2022-03-05 RX ORDER — ERYTHROMYCIN 5 MG/G
OINTMENT OPHTHALMIC ONCE
Status: DISCONTINUED | OUTPATIENT
Start: 2022-03-05 | End: 2022-03-05 | Stop reason: HOSPADM

## 2022-03-05 RX ORDER — ERYTHROMYCIN 5 MG/G
OINTMENT OPHTHALMIC
Qty: 3.5 G | Refills: 0 | Status: SHIPPED | OUTPATIENT
Start: 2022-03-05

## 2022-03-05 RX ORDER — POLYMYXIN B SULFATE AND TRIMETHOPRIM 1; 10000 MG/ML; [USP'U]/ML
1 SOLUTION OPHTHALMIC EVERY 4 HOURS
Qty: 10 ML | Refills: 0 | Status: SHIPPED | OUTPATIENT
Start: 2022-03-05 | End: 2022-03-05

## 2022-03-05 RX ADMIN — ACETAMINOPHEN 650 MG: 325 TABLET, FILM COATED ORAL at 05:20

## 2022-03-05 RX ADMIN — ACETAMINOPHEN 650 MG: 325 TABLET, FILM COATED ORAL at 12:09

## 2022-03-05 RX ADMIN — METFORMIN HYDROCHLORIDE 500 MG: 500 TABLET ORAL at 08:45

## 2022-03-05 ASSESSMENT — PAIN DESCRIPTION - PAIN TYPE
TYPE: ACUTE PAIN
TYPE: ACUTE PAIN

## 2022-03-05 ASSESSMENT — COGNITIVE AND FUNCTIONAL STATUS - GENERAL
DAILY ACTIVITIY SCORE: 21
HELP NEEDED FOR BATHING: A LITTLE
SUGGESTED CMS G CODE MODIFIER DAILY ACTIVITY: CJ
DRESSING REGULAR LOWER BODY CLOTHING: A LITTLE
DRESSING REGULAR UPPER BODY CLOTHING: A LITTLE

## 2022-03-05 ASSESSMENT — COPD QUESTIONNAIRES
DURING THE PAST 4 WEEKS HOW MUCH DID YOU FEEL SHORT OF BREATH: NONE/LITTLE OF THE TIME
DO YOU EVER COUGH UP ANY MUCUS OR PHLEGM?: NO/ONLY WITH OCCASIONAL COLDS OR INFECTIONS
COPD SCREENING SCORE: 2
HAVE YOU SMOKED AT LEAST 100 CIGARETTES IN YOUR ENTIRE LIFE: NO/DON'T KNOW

## 2022-03-05 ASSESSMENT — FIBROSIS 4 INDEX: FIB4 SCORE: 2.1

## 2022-03-05 ASSESSMENT — GAIT ASSESSMENTS: GAIT LEVEL OF ASSIST: INDEPENDENT

## 2022-03-05 NOTE — ED NOTES
Med rec completed per patient at bedside.  Allergies reviewed with patient.  No outpatient antibiotics in the last 30 days.  Patient from out of area. No preferred local pharmacy.

## 2022-03-05 NOTE — ED PROVIDER NOTES
"ED Provider Note    CHIEF COMPLAINT  No chief complaint on file.      HPI  Derrek Staples is a 66 y.o. male who presents with a chief complaint of right eye injury that he sustained while skiing earlier today.  Patient was initially evaluated at an outside facility where a CT scan demonstrated an orbital floor fracture with herniation of fat.  He has some blurred vision in the right eye but otherwise can see.  He is complaining of pain and numbness on the right side of the face.  He also has some mild pain in the right shoulder.  He denies any anticoagulation.    REVIEW OF SYSTEMS  See HPI for further details.  Right eye injury.  All other systems are negative.     PAST MEDICAL HISTORY   has a past medical history of Diabetes (HCC), Elevated cholesterol, and Hypertension.    SOCIAL HISTORY  Social History     Tobacco Use   • Smoking status: Not on file   • Smokeless tobacco: Not on file   Substance and Sexual Activity   • Alcohol use: Not on file   • Drug use: Not on file   • Sexual activity: Not on file       SURGICAL HISTORY  patient denies any surgical history    CURRENT MEDICATIONS  Home Medications    **Home medications have not yet been reviewed for this encounter**         ALLERGIES  Allergies   Allergen Reactions   • Sulfa Drugs Hives       PHYSICAL EXAM  VITAL SIGNS: BP (!) 175/101   Pulse 63   Temp 36.6 °C (97.8 °F)   Resp 20   Ht 1.753 m (5' 9\")   Wt 93 kg (205 lb)   SpO2 99%   BMI 30.27 kg/m²    Pulse ox interpretation: I interpret this pulse ox as normal.  Constitutional: Alert in no apparent distress.  HENT: No signs of trauma, Bilateral external ears normal, Nose normal.  Tacky mucous membranes.  Scant amount of dried blood in the nasal passages without septal hematoma.  Right periorbital ecchymosis.  No hemotympanum.  No malocclusion.  Eyes: Left pupil is pinpoint (likely from narcotics received prior to arrival).  Right pupil is oval in shape and unreactive.  Small conjunctival hemorrhaging " at the 9 o'clock position with ecchymosis over the lateral aspect of the right eye.  Left conjunctiva is normal.   Neck: Normal range of motion, No tenderness, Supple, No stridor.   Lymphatic: No lymphadenopathy noted.   Cardiovascular: Regular rate and rhythm, no murmurs. Pulses symmetrical.  Thorax & Lungs: Normal breath sounds, No respiratory distress, No wheezing, No chest tenderness.   Abdomen: Bowel sounds normal, Soft, No tenderness, No masses, No pulsatile masses. No peritoneal signs.  Skin: Warm, Dry, No erythema, No rash.   Back: Normal alignment.  Extremities: Intact distal pulses, No edema, right deltoid tenderness, No cyanosis.  Musculoskeletal: Good range of motion in all major joints. No major deformities noted.   Neurologic: Alert, Normal motor function, Normal sensory function, No focal deficits noted.   Psychiatric: Affect normal, Judgment normal, Mood normal.     DIAGNOSTIC STUDIES / PROCEDURES    LABS  Results for orders placed or performed during the hospital encounter of 03/04/22   DIAGNOSTIC ALCOHOL   Result Value Ref Range    Diagnostic Alcohol <10.1 0.0 - 10.0 mg/dL   CBC WITHOUT DIFFERENTIAL   Result Value Ref Range    WBC 12.0 (H) 4.8 - 10.8 K/uL    RBC 4.62 (L) 4.70 - 6.10 M/uL    Hemoglobin 14.4 14.0 - 18.0 g/dL    Hematocrit 41.9 (L) 42.0 - 52.0 %    MCV 90.7 81.4 - 97.8 fL    MCH 31.2 27.0 - 33.0 pg    MCHC 34.4 33.7 - 35.3 g/dL    RDW 44.0 35.9 - 50.0 fL    Platelet Count 166 164 - 446 K/uL    MPV 10.6 9.0 - 12.9 fL   Comp Metabolic Panel   Result Value Ref Range    Sodium 140 135 - 145 mmol/L    Potassium 3.9 3.6 - 5.5 mmol/L    Chloride 106 96 - 112 mmol/L    Co2 20 20 - 33 mmol/L    Anion Gap 14.0 7.0 - 16.0    Glucose 116 (H) 65 - 99 mg/dL    Bun 21 8 - 22 mg/dL    Creatinine 0.98 0.50 - 1.40 mg/dL    Calcium 9.7 8.5 - 10.5 mg/dL    AST(SGOT) 28 12 - 45 U/L    ALT(SGPT) 24 2 - 50 U/L    Alkaline Phosphatase 47 30 - 99 U/L    Total Bilirubin 0.8 0.1 - 1.5 mg/dL    Albumin 4.5 3.2 -  4.9 g/dL    Total Protein 7.2 6.0 - 8.2 g/dL    Globulin 2.7 1.9 - 3.5 g/dL    A-G Ratio 1.7 g/dL   Prothrombin Time   Result Value Ref Range    PT 13.2 12.0 - 14.6 sec    INR 1.03 0.87 - 1.13   APTT   Result Value Ref Range    APTT 26.2 24.7 - 36.0 sec   COD - Adult (Type and Screen)   Result Value Ref Range    ABO Grouping Only A     Rh Grouping Only POS     Antibody Screen-Cod NEG    ESTIMATED GFR   Result Value Ref Range    GFR If African American >60 >60 mL/min/1.73 m 2    GFR If Non African American >60 >60 mL/min/1.73 m 2   COV-2, FLU A/B, AND RSV BY PCR (2-4 HOURS CEPHEID): Collect NP swab in VTM    Specimen: Respirate   Result Value Ref Range    SARS-CoV-2 Source NP Swab        RADIOLOGY  [id number] FILM X-RAY   Final Result      OUTSIDE IMAGES-CT FACE   Final Result      OUTSIDE IMAGES-CT HEAD   Final Result      OUTSIDE IMAGES-CT CERVICAL SPINE   Final Result          COURSE & MEDICAL DECISION MAKING  Pertinent Labs & Imaging studies reviewed. (See chart for details)  Records obtained and reviewed: Patient seen at an outside facility after he fell onto the right side of his face while skiing.  He had no loss of consciousness and had pain on the right side of his face with associated right eye blurry vision.  CT of the maxillofacial region demonstrated a comminuted depressed fracture of the floor of the right orbit with herniation of periorbital fat but no definite muscular entrapment.  CT of the head and C-spine did not demonstrate acute traumatic injuries.  An x-ray of the right shoulder was performed that did not demonstrate bony injury.  He was given Ancef IV and an eye shield was placed.  He was transferred here for ophthalmology evaluation.    This is a 66-year-old male who arrives as a prehospital triaged trauma green transfer who was sent here as above for ophthalmology consult.  He arrives hypertensive but afebrile with otherwise normal vital signs.  He underwent expeditious primary and  secondary exam with findings as above.  He does have tacky mucous membranes but appears nontoxic.  Right pupil is oval in shape and unreactive.  He has blurred vision in the right eye.  Left pupil is pinpoint which is likely secondary to opiates given in route.  He does have periorbital ecchymosis but full range of the eye without obvious entrapment.  Ophthalmology was consulted and evaluated the patient in the emergency department.  Patient does not have an open globe injury.  He does have traumatic mydriasis and traumatic iritis and Dr. Nash, ophthalmologist, recommended Pred forte 3 times daily for 1 week as well as erythromycin ophthalmic ointment 3 times daily to the right eye for 1 week.  Patient has already received Ancef.  His tetanus booster will be updated.    Trauma labs were reassuring.    Patient was reevaluated at bedside.  He reports significant dizziness and does not think he will be able to ambulate.  He was given a dose of Zofran and IV fluids and he was observed for another 30 to 45 minutes.  When reevaluated he attempted to stand and was unable to do so due to significant difficulty.  He is staying in a hotel room on his own and is not safe for discharge presently.  Since he came in as a trauma green I did contact our on-call trauma surgeon, Dr. Smith, who has agreed to admit him overnight for additional management.  Patient was hospitalized in guarded condition.    HYDRATION  HYDRATION: Based on the patient's presentation of Dehydration the patient was given IV fluids. IV Hydration was used because oral hydration was not adequate alone. Upon recheck following hydration, the patient was unchanged.    FINAL IMPRESSION  1. Right orbital floor fracture  2. Traumatic mydriasis  3. Traumatic iritis  4. Subconjunctival hemorrhage  5. Closed head injury      Electronically signed by: Yamil Molina M.D., 3/4/2022 5:10 PM

## 2022-03-05 NOTE — DISCHARGE INSTRUCTIONS
- Call or seek medical attention for questions or concerns  - Follow up with Opthalmology  in 1-2 weeks time. Call to schedule follow up.   - Follow up with primary care provider within one weeks time. Review your home medication with your primary care doctor.    - Resume regular diet  - May take over the counter acetaminophen   - Continue daily over the counter stool softener while on narcotics  - No operation of machinery or motorized vehicles while under the influence of narcotics  - No alcohol, marijuana or illicit drug use while under the influence of narcotics  - In the event of a narcotic overdose naloxone (Narcan) is available without a prescription from any [facility] or [facility]  - Hospital staff are unable to refill your pain medication. You will need to contact your PCP or surgeon's office for refills  - No swimming, hot tubs, baths or wound submersion until cleared by outpatient provider. May shower  - No contact sports, strenuous activities, or heavy lifting until cleared by outpatient provider  - If respiratory decompensation, persistent or worsening pain, fever or signs or symptoms of infection occur seek medical attention      Discharge Instructions    Discharged to home by car with friend. Discharged via walking, hospital escort: Yes.  Special equipment needed: Not Applicable    Be sure to schedule a follow-up appointment with your primary care doctor or any specialists as instructed.     Discharge Plan:   Diet Plan: Discussed  Activity Level: Discussed  Confirmed Follow up Appointment: Appointment Scheduled  Confirmed Symptoms Management: Discussed  Medication Reconciliation Updated: Yes  Influenza Vaccine Indication: Not indicated: Previously immunized this influenza season and > 8 years of age    I understand that a diet low in cholesterol, fat, and sodium is recommended for good health. Unless I have been given specific instructions below for another diet, I accept this instruction as my  diet prescription.   Other diet: Regular    Special Instructions: None    · Is patient discharged on Warfarin / Coumadin?   No     Depression / Suicide Risk    As you are discharged from this [facility] facility, it is important to learn how to keep safe from harming yourself.    Recognize the warning signs:  · Abrupt changes in personality, positive or negative- including increase in energy   · Giving away possessions  · Change in eating patterns- significant weight changes-  positive or negative  · Change in sleeping patterns- unable to sleep or sleeping all the time   · Unwillingness or inability to communicate  · Depression  · Unusual sadness, discouragement and loneliness  · Talk of wanting to die  · Neglect of personal appearance   · Rebelliousness- reckless behavior  · Withdrawal from people/activities they love  · Confusion- inability to concentrate     If you or a loved one observes any of these behaviors or has concerns about self-harm, here's what you can do:  · Talk about it- your feelings and reasons for harming yourself  · Remove any means that you might use to hurt yourself (examples: pills, rope, extension cords, firearm)  · Get professional help from the community (Mental Health, Substance Abuse, psychological counseling)  · Do not be alone:Call your Safe Contact- someone whom you trust who will be there for you.  · Call your local CRISIS HOTLINE 528-4312 or 661-102-3502  · Call your local Children's Mobile Crisis Response Team [place] (274) 499-9261 or www.NewsiT  · Call the toll free National Suicide Prevention Hotlines   · National Suicide Prevention Lifeline 959-868-MEBG (4956)  · National Hope Line Network 800-SUICIDE (218-1762)

## 2022-03-05 NOTE — THERAPY
Occupational Therapy   Initial Evaluation     Patient Name: Derrek Staples  Age:  66 y.o., Sex:  male  Medical Record #: [id number]  Today's Date: 3/5/2022     Precautions  Comments: (P) Sling on RUE for comfort    Assessment  Patient is 66 y.o. male presents with RUE pain and pain in R eye region. Patient without blurred vision or dizziness with functional mobility during ADL task. Patient educated on parvez dressing techniques due to RUE guarding and pain.  Patient doesn't require further inpatient OT.      Plan    Recommend Occupational Therapy for Evaluation only      Outpatient OT        Subjective    Patient sitting in recliner chair upon arrival. Patient alert and cooperative during session.      Objective       03/05/22 1100   Total Time Spent   Total Time Spent (Mins) 30   Precautions   Comments Sling on RUE for comfort   Pain 0 - 10 Group   Therapist Pain Assessment   (4/10 pain in RUE, nsg informed, sling for comfort)   Cognition    Comments Alert and cooperative during session   Active ROM Upper Body   Active ROM Upper Body  X   Dominant Hand Right   Comments Guarding shoulder flexion, painful shoulder flexion   Strength Upper Body   Comments LUE WFL, RUE WFL except shoulder flexion, forarm supination/pronation   Sensation Upper Body   Comments BUE light touch sensation WFL   Activities of Daily Living   Grooming Supervision   Upper Body Dressing   (Setup, donning sling and polo shirt)   Toileting Supervision   How much help from another person does the patient currently need...   Putting on and taking off regular lower body clothing? 3   Bathing (including washing, rinsing, and drying)? 3   Toileting, which includes using a toilet, bedpan, or urinal? 4   Putting on and taking off regular upper body clothing? 3   Taking care of personal grooming such as brushing teeth? 4   Eating meals? 4   6 Clicks Daily Activity Score 21   Functional Mobility   Comments Patient ambulated in room/hallway without device, no  loss of balance or dizziness.  Patient able to visually track L and R, no blurred vision   Activity Tolerance   Comments Patient with good activity toleranc   Patient / Family Goals   Patient / Family Goal #1 Go home   Education Group   Additional Comments Educated on Kaiden dressing technique, technique donning sling and clothing. Safety with mobility during ADL   Interdisciplinary Plan of Care Collaboration   Collaboration Comments Patient alert and cooperative during session   Session Information   Date / Session Number  OT eval only

## 2022-03-05 NOTE — PROGRESS NOTES
"          Mental status adequate for full examination?: Yes    Spine cleared (radiologically and/or clinically): Yes    PHYSICAL EXAMINATION:  Vitals: /94   Pulse 73   Temp 36.2 °C (97.2 °F) (Temporal)   Resp 17   Ht 1.626 m (5' 4\")   Wt 96.6 kg (212 lb 15.4 oz)   SpO2 98%   BMI 36.56 kg/m²   Constitutional:     General Appearance: appears stated age, is in no apparent distress.  HEENT:    Periorbital ecchymosis to right side, some minor abrasions. The pupils are equal, round, and reactive to light bilaterally. The extraocular muscles are intact bilaterally. Patient's right eye is noted to have a subconjunctival hematoma.. The nares and oropharynx are clear. The midface and jaw are stable. No malocclusion is evident.  Neck:    No posterior midline cervical-spine tenderness, no evidence of intoxication, normal level of alertness (Jen Coma Scale 15), no focal neurologic deficit, and no painful distracting injuries.  Respiratory:   Inspection: Unlabored respirations, no intercostal retractions, paradoxical motion, or accessory muscle use.   Palpation:  The chest is nontender. The clavicles are non deformed bilaterally..   Auscultation: clear to auscultation.  Cardiovascular:   Auscultation: normal and regular rate and rhythm.   Peripheral Pulses: Normal.   Abdomen:   Abdomen is soft, nontender, without organomegaly or masses.  Genitourinary:   (MALE): normal male external genitalia.  Musculoskeletal:   The pelvis is stable. Right shoulder is tender.  No obvious deformity.  Placed in sling  Back:   The thoracolumbar spine was examined. Examination is remarkable for no significant tenderness, swelling, or deformity in the thoracolumbar region.  Skin:   The skin is warm and dry.  Neurologic:    [place] Coma Scale (GCS) 15 [id number]. Neurologic examination revealed no focal deficits noted.  Psychiatric:   The patient does not appear depressed or anxious.    IMAGING:  [id number] FILM X-RAY   Final " Result      OUTSIDE IMAGES-CT FACE   Final Result      OUTSIDE IMAGES-CT HEAD   Final Result      OUTSIDE IMAGES-CT CERVICAL SPINE   Final Result        All current laboratory studies/radiology exams reviewed: Yes    Completed Consultations:  opth     Pending Consultations:  Na    Newly Identified Diagnoses and Injuries:  NA    TOTAL RAP SCORE:  RAP Score Total: 6      ETOH Screening     Assessment complete date: 3/5/2022            \

## 2022-03-05 NOTE — ED NOTES
Pt transferred from [facility] after ski accident earlier today. [facility]. Pt was transferred due to potential globe rupture. Pt's R eye covered with patch on arrival. R eye pupil 3mm and oval shaped and slow to react.

## 2022-03-05 NOTE — H&P
"    TRAUMA HISTORY AND PHYSICAL    DATE OF SERVICE: 3/4/2022    ACTIVATION LEVEL: green transfer.     HISTORY OF PRESENT ILLNESS: The patient is a 66 year-old man who was injured when he fell skiing.  He was wearing a helmet and had no LOC.  He is awake and alert, GCS 15.  He complains of a headache and facial pain, as well as pain in his right shoulder.  He has been seen and cleared by ophthalmology and plastics.  He continues to have concussive symptoms including dizziness and mild nausea, along with his headache.      The patient was triaged to [facility] in accordance with established pre hospital protocols. An expeditious primary and secondary survey with required adjuncts was conducted. See Trauma Narrator for full details.    PAST MEDICAL HISTORY:   Past Medical History:   Diagnosis Date   • Diabetes (HCC)    • Elevated cholesterol    • Hypertension      Obstructive Sleep Apnea    PAST SURGICAL HISTORY: History reviewed. No pertinent surgical history.  appendectomy, tibial plateau fixation     ALLERGIES: Iodine and Sulfa drugs  .     CURRENT MEDICATIONS:   Outpatient Medications Marked as Taking for the 3/4/22 encounter (Hospital Encounter)   Medication Sig   • rosuvastatin (CRESTOR) 20 MG Tab Take 20 mg by mouth every evening.   • losartan (COZAAR) 50 MG Tab Take 50 mg by mouth every evening.   • loratadine (CLARITIN) 10 MG Tab Take 10 mg by mouth every evening.   • Cholecalciferol (VITAMIN D3) 2000 UNIT Cap Take 2,000 Units by mouth every evening.   • fluticasone (FLONASE) 50 MCG/ACT nasal spray Administer 1 Spray into affected nostril(S) 1 time a day as needed (Seasonal Allergies or Congestion).   • NON SPECIFIED Take 1-2 Capsules by mouth 2 times a day. \"Memory supplement.\" Take 2 capsules in the morning and 1 capsule in the evening.  Indications: OTC Supplement   .    FAMILY HISTORY: family history is not on file.  Reviewed and found to be non-contributory in regards to the above " "presentation    SOCIAL HISTORY:  reports that he has never smoked. He has never used smokeless tobacco. He reports current alcohol use. He reports previous drug use.  Rare use of alcohol.  [place]     REVIEW OF SYSTEMS:   Comprehensive review of systems is negative with the exception of the aforementioned HPI, PMH, and PSH bullets in accordance with CMS guidelines.    PHYSICAL EXAMINATION:   VITAL SIGNS:   /88   Pulse 64   Temp 36.6 °C (97.8 °F)   Resp 16   Ht 1.626 m (5' 4\")   Wt 93 kg (205 lb)   SpO2 97%     GENERAL:   · The patient appears stated age, is in no apparent distress    HEENT:  · HEAD: Facial ecchymosis, normocephalic.    · EARS: The ear canals and tympanic membranes are normal.Normal pinna bilaterally.    · EYES:  The right pupil is 3.5 mm and briskly reactive to light. The left pupil is 2 mm and briskly reactive to light. The extraocular muscles cannot be assessed. Swelling and ecchymosis about the e ye..    · NOSE: No rhinorrhea.  The bilateral nares are clear.  · THROAT: Oral mucosa is moist.  The oropharynx are clear.    FACE:   · The midface and jaw are stable. No malocclusion is evident.    NECK:    · The cervical spine was examined utilizing spinal motion restriction.   · The cervical spine is supple and non tender. Normal range of motion. No posterior midline cervical-spine tenderness, no evidence of intoxication, normal level of alertness ([place] Coma Scale 15), no focal neurologic deficit, and no painful distracting injuries. The trachea is midline..    CHEST:    · Inspection: Unlabored respirations, no intercostal retractions, paradoxical motion, or accessory muscle use.  · Palpation:  The chest is nontender. The clavicles are non deformed bilaterally..  · Auscultation: normal.    CARDIOVASCULAR:    · Auscultation: normal and regular rate and rhythm.  · Peripheral Pulses: Normal.      ABDOMEN:    · Abdomen is soft, nontender, without " organomegaly or masses.    BACK/PELVIS:    · The thoracolumbar spine was examined utilizing spinal motion restriction.   · Inspection and palpation reveal no significant tenderness, swelling, or deformity in the thoracolumbar region.  · The pelvis is stable.    RECTAL:  Deferred    GENITOURINARY:  The patient has normal external reproductive anatomy.    EXTREMITIES:  · RIGHT ARM: Without deformities, wounds, lacerations, or excoriations.  Full passive and active range of motion without pain.  · LEFT ARM: Without deformities, wounds, lacerations, or excoriations.  Full passive and active range of motion without pain.  · RIGHT LEG: Without deformities, wounds, lacerations, or excoriations.  Full passive and active range of motion without pain.  · LEFT LEG: Without deformities, wounds, lacerations, or excoriations.  Full passive and active range of motion without pain.    NEUROLOGIC:    · [place] Coma Scale (GCS) 15.   · Neurologic examination revealed no focal deficits noted, mental status intact, muscle tone normal, muscle strength normal, oriented for age x3.    SKIN:  · The skin is warm and dry.    PSYCHIATRIC:   · The patient does not appear depressed or anxious, short and long term memory appears intact.    LABORATORY VALUES:   Recent Labs     03/04/22  1230 03/04/22  1705   WBC 9.6 12.0*   RBC 4.71 4.62*   HEMOGLOBIN 14.6 14.4   HEMATOCRIT 42.2 41.9*   MCV 89.6 90.7   MCH 31.0 31.2   MCHC 34.6 34.4   RDW 13.1 44.0   PLATELETCT 179 166   MPV 10.5* 10.6     Recent Labs     03/04/22  1230 03/04/22  1705   SODIUM 142 140   POTASSIUM 4.5 3.9   CHLORIDE 109* 106   CO2 21* 20   GLUCOSE 132* 116*   BUN 22* 21   CREATININE 1.1 0.98   CALCIUM 9.9 9.7     Recent Labs     03/04/22  1230 03/04/22  1705   ASTSGOT 42 28   ALTSGPT 34 24   TBILIRUBIN 1.0 0.8   ALKPHOSPHAT 50 47   GLOBULIN  --  2.7   INR 1.03 1.03     Recent Labs     03/04/22  1230 03/04/22  1705   APTT 24.8 26.2   INR 1.03 1.03        IMAGING:    [id number] FILM X-RAY   Final Result      OUTSIDE IMAGES-CT FACE   Final Result      OUTSIDE IMAGES-CT HEAD   Final Result      OUTSIDE IMAGES-CT CERVICAL SPINE   Final Result          IMPRESSION AND PLAN:  Concussion without loss of consciousness- (present on admission)  Assessment & Plan  Speech Language Pathology cognitive evaluation.    Closed fracture of right orbital floor (HCC)- (present on admission)  Assessment & Plan  Comminuted depressed fracture of the floor of the right orbit, with herniation of periorbital fat, but no definite muscular entrapment.  Non- operative management.   Follow up on outpatient basis.   Clifton Webster MD [facility].  MD Cristian Mathew and [facility].    Seasonal allergies- (present on admission)  Assessment & Plan  Chronic condition treated with Flonase and Claritin.  Resumed maintenance medication on admission.    Hypertension- (present on admission)  Assessment & Plan  Chronic condition treated with losartan.  Resumed maintenance medication on admission.    Diabetes (HCC)- (present on admission)  Assessment & Plan  Chronic condition treated with metformin.  Resumed maintenance medication on admission.    Hyperlipidemia- (present on admission)  Assessment & Plan  Chronic condition treated with rosuvastatin.  Resumed maintenance medication on admission.    Obstructive sleep apnea syndrome- (present on admission)  Assessment & Plan  Chronic condition treated with CPAP.  CPAP ordered to be resumed.     Encounter for screening for COVID-19- (present on admission)  Assessment & Plan  3/4 COVID-19 specimen sent. AIRBORNE & CONTACT/EYE ISOLATION implemented pending final SARS-CoV-2 testing.      No contraindication to deep vein thrombosis (DVT) prophylaxis- (present on admission)  Assessment & Plan  Prophylactic dose enoxaparin initiated upon admission.    Trauma- (present on admission)  Assessment & Plan  Skiing.  Trauma Green Transfer Activation  [facility].  Funmi Smith MD. Trauma Surgery.      Aggregated care time spent evaluating, reviewing documentation, providing care, and managing this patient exclusive of procedures: 60 minutes  ____________________________________   Funmi Smith M.D.     LORRIE / ALEC     DD: 3/4/2022   DT: 8:53 PM

## 2022-03-05 NOTE — ASSESSMENT & PLAN NOTE
Comminuted depressed fracture of the floor of the right orbit, with herniation of periorbital fat, but no definite muscular entrapment.  Non- operative management.   Follow up on outpatient basis.   Clifton Webster MD [facility].  MD Cristian Mathew and [facility].

## 2022-03-05 NOTE — CONSULTS
"CC: eye trauma OD    HPI: 65 yo male with right facial/eye trauma while skiing earlier today, CT at outside facility showed orbital floor fx with fat herniation. Pt noted decreased/blurry vision after trauma, along with eyelid swelling. No flashes/floaters. No veils/curtains/blind spots. No hx of past traumas or eye surgeries.     POHx:   Presbyopia  CANDIE few months ago    Past Medical History:   Diagnosis Date   • Diabetes (HCC)    • Elevated cholesterol    • Hypertension        Scheduled Medications   Medication Dose Frequency   • fentaNYL  50 mcg Once   • ondansetron  4 mg Once     No current facility-administered medications on file prior to encounter.     Current Outpatient Medications on File Prior to Encounter   Medication Sig Dispense Refill   • rosuvastatin (CRESTOR) 20 MG Tab Take 20 mg by mouth every evening.     • losartan (COZAAR) 50 MG Tab Take 50 mg by mouth every evening.     • loratadine (CLARITIN) 10 MG Tab Take 10 mg by mouth every evening.     • Cholecalciferol (VITAMIN D3) 2000 UNIT Cap Take 2,000 Units by mouth every evening.     • fluticasone (FLONASE) 50 MCG/ACT nasal spray Administer 1 Spray into affected nostril(S) 1 time a day as needed (Seasonal Allergies or Congestion).     • NON SPECIFIED Take 1-2 Capsules by mouth 2 times a day. \"Memory supplement.\" Take 2 capsules in the morning and 1 capsule in the evening.  Indications: OTC Supplement     • metFORMIN (GLUCOPHAGE) 500 MG Tab Take 500 mg by mouth 2 times a day with meals.         Social History     Tobacco Use   • Smoking status: Never Smoker   • Smokeless tobacco: Never Used   Substance Use Topics   • Alcohol use: Yes     Comment: rarely 1-2 a month   • Drug use: Not Currently     History reviewed. No pertinent family history.    IMAGING:  CT of the maxillofacial region demonstrated a comminuted depressed fracture of the floor of the right orbit with herniation of periorbital fat but no definite muscular entrapment. "       EXAM:  Vitals:    03/04/22 1706   BP: (!) 175/101   Pulse:    Resp:    Temp: 36.6 °C (97.8 °F)   SpO2:      VA OD 20/50 equiv at near with +2.75 lens, 20/30 equiv OS  Ext: eyelid edema, ecchymosis, able to open eye without difficutly  Motility: full, mild pain with eye movement OD  Pupils: 3.5mm OD and round. 2mm OS. No APD, reactive OU  CF - full OU  IOP with [employer] 18/14  SLE: 2+NSC OU, large conj heme OD temporally, no laceration, AC deep and equal OU, tr cell OD  DFE: No heme, no RT/RD, no ONH edema OU.       A/P:  67 yo with facial/eye trauma OD  -No open globe on exam.   -Orbital fracture - agree with ENT/facial plastics consult.  -Traumatic mydriasis OD, monitor.  -Traumatic iritis OD, mild - PF TID OD x 1 week. Follow-up with home optometrist/ophthalmologist.  -Subconj hemorrhage OD - e/mycin ophthalmic gene to eye TID OD and PRN, x 1 week.  -DM - no retinopathy.      Clifton Nash M.D.  [facility]  [phone]

## 2022-03-05 NOTE — ED TRIAGE NOTES
"Chief Complaint   Patient presents with   • Trauma Green     Pt transferred from [facility] after ski accident earlier today. Pt was skiing and ran into [place] on his right side. Pt was transferred due to potential globe rupture. Pt's R eye covered with patch on arrival. R eye pupil 3mm and oval shaped and slow to react.          Pt BIBA for above complaint. Pt A+Ox4 on arrival. EMS gave 4 mg of zofran, 4 mg of morphine and 2g of ancef given by [place]. Pt denies any visual loss in right eye.      BP (!) 175/101   Pulse 63   Temp 36.6 °C (97.8 °F)   Resp 20   Ht 1.626 m (5' 4\")   Wt 93 kg (205 lb)   SpO2 99%     "

## 2022-03-05 NOTE — PROGRESS NOTES
4 Eyes Skin Assessment Completed by NATI Farmer and NATI Cadet.    Head Redness and Edema and minor scratches (Right eye)  Ears WDL  Nose WDL  Mouth WDL  Neck WDL  Breast/Chest WDL  Shoulder Blades WDL  Spine WDL  (R) Arm/Elbow/Hand WDL  (L) Arm/Elbow/Hand WDL  Abdomen WDL  Groin WDL  Scrotum/Coccyx/Buttocks WDL  (R) Leg WDL  (L) Leg WDL  (R) Heel/Foot/Toe WDL  (L) Heel/Foot/Toe WDL          Devices In Places Nasal Cannula      Interventions In Place Pillows and Pressure Redistribution Mattress    Possible Skin Injury No    Pictures Uploaded Into Epic N/A  Wound Consult Placed N/A  RN Wound Prevention Protocol Ordered No

## 2022-03-05 NOTE — ASSESSMENT & PLAN NOTE
Chronic condition treated with Flonase and Claritin.  Resumed maintenance medication on admission.

## 2022-03-05 NOTE — ASSESSMENT & PLAN NOTE
3/4 COVID-19 specimen sent. AIRBORNE & CONTACT/EYE ISOLATION implemented pending final SARS-CoV-2 testing.

## 2022-03-05 NOTE — DISCHARGE SUMMARY
Trauma Discharge Summary    DATE OF ADMISSION: 3/4/2022    DATE OF DISCHARGE: 3/5/2022    LENGTH OF STAY: 1 day    ATTENDING PHYSICIAN: Funmi Smith M.D.    CONSULTING PHYSICIAN:   Zina. Clifton Barnett MD Ophthalmology     DISCHARGE DIAGNOSIS:  Active Problems:    Closed fracture of right orbital floor (HCC) POA: Yes    Concussion without loss of consciousness POA: Yes    Obstructive sleep apnea syndrome POA: Yes    Diabetes (HCC) POA: Yes    Trauma POA: Yes    No contraindication to deep vein thrombosis (DVT) prophylaxis POA: Yes    Encounter for screening for COVID-19 POA: Yes    Hyperlipidemia POA: Yes    Hypertension POA: Yes    Seasonal allergies POA: Yes    Acute pain of right shoulder POA: Yes  Resolved Problems:    * No resolved hospital problems. *      PROCEDURES:  1. None    HISTORY OF PRESENT ILLNESS: The patient is a 66 y.o. male who was reportedly injured in a fall while skiing. He was transferred to [facility] in [place], [place].    HOSPITAL COURSE: The patient was triaged as a partial trauma activation. The patient was transported to [facility] after he was unable to tolerate ambulation the previous night in the emergency room.  Patient has been up in a chair and feels he is able to go.  He does have a ride home.  He does have the ability to follow-up with a primary caregiver back in his hometown and he will also follow-up with an ophthalmologist.  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  .    HOSPITAL PROBLEM LIST:  Concussion without loss of consciousness- (present on admission)  Assessment & Plan  Residual nausea and dizziness.  Speech Language Pathology cognitive evaluation.    Closed fracture of right orbital floor (HCC)- (present on admission)  Assessment & Plan  Comminuted depressed fracture of the floor of the right orbit, with herniation of periorbital fat, but no  definite muscular entrapment.  Non- operative management.   Follow up on outpatient basis.   Clifton Webster MD [facility].  MD Cristian Mathew and [facility].    Diabetes (HCC)- (present on admission)  Assessment & Plan  Chronic condition treated with metformin.  Resumed maintenance medication on admission.    Obstructive sleep apnea syndrome- (present on admission)  Assessment & Plan  Chronic condition treated with CPAP.  CPAP ordered to be resumed.     Acute pain of right shoulder- (present on admission)  Assessment & Plan  X-ray negative for fracture.  Sling for comfort.    Seasonal allergies- (present on admission)  Assessment & Plan  Chronic condition treated with Flonase and Claritin.  Resumed maintenance medication on admission.    Hypertension- (present on admission)  Assessment & Plan  Chronic condition treated with losartan.  Resumed maintenance medication on admission.    Hyperlipidemia- (present on admission)  Assessment & Plan  Chronic condition treated with rosuvastatin.  Resumed maintenance medication on admission.    Encounter for screening for COVID-19- (present on admission)  Assessment & Plan  3/4 COVID-19 specimen sent. AIRBORNE & CONTACT/EYE ISOLATION implemented pending final SARS-CoV-2 testing.      No contraindication to deep vein thrombosis (DVT) prophylaxis- (present on admission)  Assessment & Plan  Prophylactic dose enoxaparin initiated upon admission.    Trauma- (present on admission)  Assessment & Plan  Skiing.  Trauma Green Transfer Activation [facility].  Funmi Smith MD. Trauma Surgery.          DISPOSITION: Discharged home on 3/5/2022. The patient was  were counseled and questions were answered. Specifically, signs and symptoms of infection, respiratory decompensation, worsening vision and persistent or worsening pain were discussed and the patient agrees to seek medical attention if any of these develop.    DISCHARGE MEDICATIONS:  The  "patients controlled substance history was reviewed and a controlled substance use informed consent (if applicable) was provided by [facility] and the patient has been prescribed.     Medication List      START taking these medications      Instructions   acetaminophen 325 MG Tabs  Commonly known as: Tylenol   Take 2 Tablets by mouth every 6 hours.  Dose: 650 mg     erythromycin 5 MG/GM Oint   Place in eye 3 times daily for 1 week then as needed.     methocarbamol 750 MG Tabs  Commonly known as: ROBAXIN   Take 1 Tablet by mouth 3 times a day as needed for up to 14 days.  Dose: 750 mg        CONTINUE taking these medications      Instructions   fluticasone 50 MCG/ACT nasal spray  Commonly known as: FLONASE   Administer 1 Spray into affected nostril(S) 1 time a day as needed (Seasonal Allergies or Congestion).  Dose: 1 Spray     loratadine 10 MG Tabs  Commonly known as: CLARITIN   Take 10 mg by mouth every evening.  Dose: 10 mg     losartan 50 MG Tabs  Commonly known as: COZAAR   Take 50 mg by mouth every evening.  Dose: 50 mg     metFORMIN 500 MG Tabs  Commonly known as: GLUCOPHAGE   Take 500 mg by mouth 2 times a day with meals.  Dose: 500 mg     NON SPECIFIED   Take 1-2 Capsules by mouth 2 times a day. \"Memory supplement.\" Take 2 capsules in the morning and 1 capsule in the evening.  Indications: OTC Supplement  Dose: 1-2 Capsule     rosuvastatin 20 MG Tabs  Commonly known as: CRESTOR   Take 20 mg by mouth every evening.  Dose: 20 mg     Vitamin D3 2000 UNIT Caps   Take 2,000 Units by mouth every evening.  Dose: 2,000 Units            ACTIVITY:  As tolerated    WOUND CARE:  Erythromycin ointment to eye 3 times daily and as needed x7 days.    DIET:  Orders Placed This Encounter   Procedures   • Diet Order Diet: Consistent CHO (Diabetic)     Standing Status:   Standing     Number of Occurrences:   1     Order Specific Question:   Diet:     Answer:   Consistent CHO (Diabetic) [4]       FOLLOW " UP:  No follow-up provider specified.    TIME SPENT ON DISCHARGE: 32 minutes      ____________________________________________  CHRISTIN Campbell    DD: 3/5/2022 11:26 AM

## 2022-03-05 NOTE — ED NOTES
RN attempted to road test pt, pt unable to change to sitting position without assistance and felt too dizzy to stand at the bedside by himself. MD notified

## 2022-03-05 NOTE — ASSESSMENT & PLAN NOTE
Skiing.  Trauma Green Transfer Activation [facility].  Funmi Smith MD. Trauma Surgery.

## 2022-03-05 NOTE — CARE PLAN
The patient is Stable - Low risk of patient condition declining or worsening    Shift Goals  Clinical Goals: Monitor neuro status  Patient Goals: Rest  Family Goals: CARLITOS    Progress made toward(s) clinical / shift goals:      Problem: Neuro Status  Goal: Neuro status will remain stable or improve  Outcome: Progressing    Q4H neuro checks in place. Pt's neurological status (A/Ox4) remains unchanged throughout shift. Bed alarm is on, call light within reach.     Patient is not progressing towards the following goals:

## (undated) DEVICE — PREP ALCOHOL PAD LARGE

## (undated) DEVICE — BANDAID FLEXIBLE FABRIC 1IN 50/BX LATEX FREE

## (undated) DEVICE — GLOVE SZ 6 LINER PROTEXIS PI BL

## (undated) DEVICE — GLOVE SZ 6 PROTEXIS PI

## (undated) DEVICE — NEEDLE DISP HYPO 25GX1-1/2IN

## (undated) DEVICE — GOWN SURGICAL REINFORCED X-LAR

## (undated) DEVICE — NEEDLE SAFE BLUNT 18G

## (undated) DEVICE — SYRINGE DISP LUER-LOK  3 CC

## (undated) DEVICE — DRESSING MEPILEX 4X4 BORDER

## (undated) DEVICE — SYRINGE DISP LUER-LOK 10 CC